# Patient Record
Sex: MALE | Race: WHITE | NOT HISPANIC OR LATINO | Employment: FULL TIME | URBAN - NONMETROPOLITAN AREA
[De-identification: names, ages, dates, MRNs, and addresses within clinical notes are randomized per-mention and may not be internally consistent; named-entity substitution may affect disease eponyms.]

---

## 2020-05-18 ENCOUNTER — OFFICE VISIT (OUTPATIENT)
Dept: FAMILY MEDICINE CLINIC | Facility: CLINIC | Age: 40
End: 2020-05-18

## 2020-05-18 VITALS
TEMPERATURE: 97.5 F | DIASTOLIC BLOOD PRESSURE: 86 MMHG | OXYGEN SATURATION: 97 % | BODY MASS INDEX: 32.32 KG/M2 | HEIGHT: 69 IN | SYSTOLIC BLOOD PRESSURE: 126 MMHG | HEART RATE: 76 BPM | WEIGHT: 218.2 LBS

## 2020-05-18 DIAGNOSIS — I10 ESSENTIAL HYPERTENSION: ICD-10-CM

## 2020-05-18 DIAGNOSIS — Z76.89 ENCOUNTER TO ESTABLISH CARE: Primary | ICD-10-CM

## 2020-05-18 DIAGNOSIS — H60.393 OTHER INFECTIVE ACUTE OTITIS EXTERNA OF BOTH EARS: ICD-10-CM

## 2020-05-18 DIAGNOSIS — Z00.00 GENERAL MEDICAL EXAM: ICD-10-CM

## 2020-05-18 PROCEDURE — 99202 OFFICE O/P NEW SF 15 MIN: CPT | Performed by: NURSE PRACTITIONER

## 2020-05-18 RX ORDER — OFLOXACIN 3 MG/ML
10 SOLUTION AURICULAR (OTIC) DAILY
Qty: 10 ML | Refills: 0 | Status: SHIPPED | OUTPATIENT
Start: 2020-05-18 | End: 2020-05-26

## 2020-05-18 RX ORDER — HYDROCHLOROTHIAZIDE 12.5 MG/1
12.5 CAPSULE, GELATIN COATED ORAL 2 TIMES DAILY
Qty: 180 CAPSULE | Refills: 3 | Status: SHIPPED | OUTPATIENT
Start: 2020-05-18 | End: 2021-02-16 | Stop reason: SDUPTHER

## 2020-05-18 RX ORDER — HYDROCHLOROTHIAZIDE 12.5 MG/1
CAPSULE, GELATIN COATED ORAL
COMMUNITY
Start: 2020-02-21 | End: 2020-05-18

## 2020-05-18 RX ORDER — LOSARTAN POTASSIUM 50 MG/1
50 TABLET ORAL 2 TIMES DAILY
Qty: 180 TABLET | Refills: 3 | Status: SHIPPED | OUTPATIENT
Start: 2020-05-18 | End: 2021-02-16 | Stop reason: SDUPTHER

## 2020-05-18 RX ORDER — LOSARTAN POTASSIUM 50 MG/1
50 TABLET ORAL 2 TIMES DAILY
COMMUNITY
Start: 2020-02-21 | End: 2020-05-18

## 2020-05-18 NOTE — PROGRESS NOTES
Subjective   Lloyd Hirsch is a 39 y.o. male. Establish care.    History of Present Illness Pt presents today to establish care. Had previous primary care provider in [place], [place]. His past medical history includes: essential hypertension. Denies any surgeries in the past. Family history includes: heart attack (grandparents), hypertension (mother), and oral CA (paternal grandfather). His is employed as  at [employer] in [place], [place]. Needs his blood pressure medications refilled. His only concern today is pruritis of ears. Denies any chest pain, heart palpations, lower extremity edema, or problems with bowel or bladder function.      The following portions of the patient's history were reviewed and updated as appropriate: allergies, current medications, past family history, past medical history, past social history, past surgical history and problem list.    Review of Systems   Constitutional: Negative for chills, fatigue and fever.   HENT: Positive for ear pain (pruritis of ears bilaterally ). Negative for congestion, rhinorrhea and sore throat.    Eyes: Negative for blurred vision, double vision and visual disturbance.   Respiratory: Negative for cough, chest tightness, shortness of breath and wheezing.    Cardiovascular: Negative for chest pain, palpitations and leg swelling.   Gastrointestinal: Negative for abdominal pain, diarrhea, nausea and vomiting.   Endocrine: Negative for cold intolerance and heat intolerance.   Genitourinary: Negative for difficulty urinating, dysuria, frequency and hematuria.   Musculoskeletal: Negative for arthralgias, back pain, neck pain and neck stiffness.   Skin: Negative for dry skin, pallor, rash, skin lesions and bruise.   Allergic/Immunologic: Negative for environmental allergies, food allergies and immunocompromised state.   Neurological: Negative for dizziness, syncope, weakness, light-headedness, headache and confusion.   Hematological: Negative for  adenopathy. Does not bruise/bleed easily.   Psychiatric/Behavioral: Negative for self-injury, sleep disturbance, suicidal ideas, depressed mood and stress. The patient is not nervous/anxious.        Objective   Physical Exam   Constitutional: He is oriented to person, place, and time. He appears well-developed and well-nourished. He is cooperative. He does not have a sickly appearance. He does not appear ill. No distress.   HENT:   Head: Normocephalic.   Right Ear: Hearing, tympanic membrane, external ear and ear canal normal. There is swelling. No drainage (reports drainage but none seen at this time).   Left Ear: Hearing, tympanic membrane, external ear and ear canal normal. There is swelling. No drainage (reports drainage but none seen as this time).   Nose: Nose normal.   Mouth/Throat: Uvula is midline, oropharynx is clear and moist and mucous membranes are normal.   Eyes: Pupils are equal, round, and reactive to light. Conjunctivae, EOM and lids are normal.   Neck: Trachea normal, normal range of motion, full passive range of motion without pain and phonation normal. Neck supple. Carotid bruit is not present. No thyroid mass and no thyromegaly present.   Cardiovascular: Normal rate, regular rhythm, S1 normal, S2 normal and normal heart sounds.   No murmur heard.  Pulmonary/Chest: Effort normal and breath sounds normal. No respiratory distress. He has no wheezes. He has no rhonchi. He has no rales.   Abdominal: Soft. Normal appearance. There is no tenderness.   Neurological: He is alert and oriented to person, place, and time. He has normal strength. No cranial nerve deficit. Coordination and gait normal.   Skin: Skin is warm, dry and intact. He is not diaphoretic. No pallor.   Psychiatric: He has a normal mood and affect. His speech is normal and behavior is normal. Judgment and thought content normal. Cognition and memory are normal.     There were no vitals filed for this visit.      Assessment/Plan   Lloyd  was seen today for establish care and hypertension.    Diagnoses and all orders for this visit:    Encounter to establish care    Essential hypertension  -     hydroCHLOROthiazide (MICROZIDE) 12.5 MG capsule; Take 1 capsule by mouth 2 (Two) Times a Day.  -     losartan (COZAAR) 50 MG tablet; Take 1 tablet by mouth 2 (Two) Times a Day.  -     Lipid Panel  -     TSH  -     CBC (No Diff)  -     Comprehensive metabolic panel    General medical exam  -     Lipid Panel  -     TSH  -     CBC (No Diff)  -     Comprehensive metabolic panel    Other infective acute otitis externa of both ears  -     ofloxacin (FLOXIN) 0.3 % otic solution; Administer 10 drops into both ears once daily for 7 days.    Encounter to establish care- He recently moved here and needed new PCP. I gathered his past medical and family history and competed physical exam, I look forward to caring for him.   Essential hypertension- Refilled BP medications. BP goal less than 130/90. Ordered labs (fasting) due to hypertension and general medical exam, will call with results.   Other infective acute otitis externa of both ears- ofloxacin 0.3% administer 10 drops in both ears once daily for 7 days. Pt educated on importance of not scratching ears and this leads to infections.  If symptoms do not improve or worsen, patient was instructed to return to clinic for further evaluation.         This document has been electronically signed by STEF Panchal on  May 18, 2020 09:50

## 2020-05-18 NOTE — PATIENT INSTRUCTIONS

## 2020-06-05 DIAGNOSIS — K21.9 GASTROESOPHAGEAL REFLUX DISEASE, ESOPHAGITIS PRESENCE NOT SPECIFIED: Primary | ICD-10-CM

## 2020-06-05 NOTE — TELEPHONE ENCOUNTER
PATIENT STATES HE HAS TRIED OTHER THINGS FOR HEART BURN.  HE SAID HE TAKES A VERY LOW DOSE NEXIUM.  HE CUTS OTC ONES IN HALF, BUT SAVES MONEY IF HE CAN GET RX FOR IT.  [facility]

## 2020-06-05 NOTE — TELEPHONE ENCOUNTER
Has he tried taking any heart burn medications before taking nexium? I hate to start him on nexium before trying something less potent. We usually try Pepcid first.

## 2020-06-05 NOTE — TELEPHONE ENCOUNTER
I sent in Nexium to [facility] pharmacy. If he would like I can also print off educational material that discusses other things that help with heartburn. If he would like that I will print and leave at desk for him to . Thanks.

## 2020-09-03 DIAGNOSIS — K21.9 GASTROESOPHAGEAL REFLUX DISEASE, ESOPHAGITIS PRESENCE NOT SPECIFIED: ICD-10-CM

## 2020-11-16 ENCOUNTER — OFFICE VISIT (OUTPATIENT)
Dept: FAMILY MEDICINE CLINIC | Facility: CLINIC | Age: 40
End: 2020-11-16

## 2020-11-16 VITALS
OXYGEN SATURATION: 97 % | HEART RATE: 80 BPM | SYSTOLIC BLOOD PRESSURE: 130 MMHG | HEIGHT: 60 IN | WEIGHT: 217.6 LBS | BODY MASS INDEX: 42.72 KG/M2 | DIASTOLIC BLOOD PRESSURE: 70 MMHG | TEMPERATURE: 97.5 F

## 2020-11-16 DIAGNOSIS — K21.9 GASTROESOPHAGEAL REFLUX DISEASE: ICD-10-CM

## 2020-11-16 DIAGNOSIS — T14.8XXA MUSCLE STRAIN: Primary | ICD-10-CM

## 2020-11-16 PROCEDURE — 99213 OFFICE O/P EST LOW 20 MIN: CPT | Performed by: NURSE PRACTITIONER

## 2020-11-16 RX ORDER — IBUPROFEN 600 MG/1
600 TABLET ORAL EVERY 6 HOURS PRN
Qty: 60 TABLET | Refills: 1 | Status: SHIPPED | OUTPATIENT
Start: 2020-11-16

## 2020-11-16 RX ORDER — METHYLPREDNISOLONE 4 MG/1
TABLET ORAL
Qty: 21 EACH | Refills: 0 | Status: SHIPPED | OUTPATIENT
Start: 2020-11-16

## 2020-11-16 NOTE — PROGRESS NOTES
Subjective   Lloyd Hirsch is a 40 y.o. male. Arm pain (left)    History of Present Illness   Patient presents today for left arm pain. He says the pain started around 3-4 weeks ago. Pain located in inner aspect of elbow. He describes pain as dull ache. Pain worsened with arm extension and improves with flexion. Denies any recent injury. Reports that he has been using his chain saw but feels he uses his right arm more than his left. At home he has tried using tennis elbow band which has not provided much relief.     The following portions of the patient's history were reviewed and updated as appropriate: allergies, current medications, past family history, past medical history, past social history, past surgical history, and problem list.    Review of Systems   Constitutional: Negative for chills, fatigue and fever.   HENT: Negative for congestion, ear pain, rhinorrhea and sore throat.    Eyes: Negative for blurred vision, double vision and visual disturbance.   Respiratory: Negative for cough, chest tightness, shortness of breath and wheezing.    Cardiovascular: Negative for chest pain, palpitations and leg swelling.   Gastrointestinal: Negative for abdominal pain, diarrhea, nausea and vomiting.   Endocrine: Negative for cold intolerance and heat intolerance.   Genitourinary: Negative for difficulty urinating, dysuria, frequency and hematuria.   Musculoskeletal: Positive for arthralgias (left arm pain). Negative for back pain, neck pain and neck stiffness.   Skin: Negative for dry skin, pallor, rash, skin lesions and bruise.   Allergic/Immunologic: Negative for environmental allergies, food allergies and immunocompromised state.   Neurological: Negative for dizziness, syncope, weakness, light-headedness, headache and confusion.   Hematological: Negative for adenopathy. Does not bruise/bleed easily.   Psychiatric/Behavioral: Negative for self-injury, sleep disturbance, suicidal ideas, depressed mood and stress. The  patient is not nervous/anxious.        Vitals:    11/16/20 0820   BP: 130/70   Pulse: 80   Temp: 97.5 °F (36.4 °C)   SpO2: 97%     Body mass index is 208.16 kg/m².    Objective   Physical Exam  Vitals signs and nursing note reviewed.   Constitutional:       Appearance: He is well-developed.   HENT:      Head: Normocephalic.   Eyes:      Conjunctiva/sclera: Conjunctivae normal.   Neck:      Musculoskeletal: Full passive range of motion without pain, normal range of motion and neck supple.   Musculoskeletal: Normal range of motion.      Left elbow: He exhibits normal range of motion, no swelling, no effusion, no deformity and no laceration.   Skin:     General: Skin is warm and dry.   Neurological:      Mental Status: He is alert and oriented to person, place, and time.      Coordination: Coordination normal.      Gait: Gait normal.   Psychiatric:         Attention and Perception: Attention normal.         Mood and Affect: Mood normal.         Speech: Speech normal.         Behavior: Behavior normal. Behavior is cooperative.         Thought Content: Thought content normal.         Cognition and Memory: Cognition normal.         Judgment: Judgment normal.       Vitals:    11/16/20 0820   BP: 130/70   Pulse: 80   Temp: 97.5 °F (36.4 °C)   SpO2: 97%           Assessment/Plan   Diagnoses and all orders for this visit:    1. Muscle strain (Primary)  -     ibuprofen (ADVIL,MOTRIN) 600 MG tablet; Take 1 tablet by mouth Every 6 (Six) Hours As Needed for Mild Pain  or Moderate Pain .  Dispense: 60 tablet; Refill: 1  -     methylPREDNISolone (MEDROL) 4 MG dose pack; Take as directed on package instructions.  Dispense: 21 each; Refill: 0    2. Gastroesophageal reflux disease  -     esomeprazole (nexIUM) 20 MG capsule; Take 1 capsule by mouth Every Morning Before Breakfast.  Dispense: 90 capsule; Refill: 3    Pt instructed to rest, heat, and stretch area of strain. It usually takes several weeks to heal a strain. He can take  ibuprofen 600 mg every 8 hours PRN for pain. I also want him to take a medrol raoul as directed to help reduce inflammation in the area.  Refilled Nexium for GERD symptoms.  If symptoms do not improve or worsen, patient was instructed to return to clinic for further evaluation.         This document has been electronically signed by STEF Panchal on  November 16, 2020 10:00 CST

## 2020-11-16 NOTE — PATIENT INSTRUCTIONS
Tendinitis    Tendinitis is inflammation of a tendon. A tendon is a strong cord of tissue that connects muscle to bone.  Tendinitis can affect any tendon, but it most commonly affects the:  · Shoulder tendon (rotator cuff).  · Ankle tendon (Achilles tendon).  · Elbow tendon (triceps tendon).  · Tendons in the wrist.  What are the causes?  This condition may be caused by:  · Overusing a tendon or muscle. This is common.  · Age-related wear and tear.  · Injury.  · Inflammatory conditions, such as arthritis.  · Certain medicines.  What increases the risk?  You are more likely to develop this condition if you do activities that involve the same movements over and over again (repetitive motions).  What are the signs or symptoms?  Symptoms of this condition may include:  · Pain.  · Tenderness.  · Mild swelling.  · Decreased range of motion.  How is this diagnosed?  This condition is diagnosed with a physical exam. You may also have tests, such as:  · Ultrasound. This uses sound waves to make an image of the inside of your body in the affected area.  · MRI.  How is this treated?  This condition may be treated by resting, icing, applying pressure (compression), and raising (elevating) the affected area above the level of your heart. This is known as RICE therapy. Treatment may also include:  · Medicines to help reduce inflammation or to help reduce pain.  · Exercises or physical therapy to strengthen and stretch the tendon.  · A brace or splint.  · Surgery. This is rarely needed.  Follow these instructions at home:  If you have a splint or brace:  · Wear the splint or brace as told by your health care provider. Remove it only as told by your health care provider.  · Loosen the splint or brace if your fingers or toes tingle, become numb, or turn cold and blue.  · Keep the splint or brace clean.  · If the splint or brace is not waterproof:  ? Do not let it get wet.  ? Cover it with a watertight covering when you take a bath  or shower.  Managing pain, stiffness, and swelling  · If directed, put ice on the affected area.  ? If you have a removable splint or brace, remove it as told by your health care provider.  ? Put ice in a plastic bag.  ? Place a towel between your skin and the bag.  ? Leave the ice on for 20 minutes, 2-3 times a day.  · Move the fingers or toes of the affected limb often, if this applies. This can help to prevent stiffness and lessen swelling.  · If directed, raise (elevate) the affected area above the level of your heart while you are sitting or lying down.  · If directed, apply heat to the affected area before you exercise. Use the heat source that your health care provider recommends, such as a moist heat pack or a heating pad.         ? Place a towel between your skin and the heat source.  ? Leave the heat on for 20-30 minutes.  ? Remove the heat if your skin turns bright red. This is especially important if you are unable to feel pain, heat, or cold. You may have a greater risk of getting burned.  Driving  · Do not drive or use heavy machinery while taking prescription pain medicine.  · Ask your health care provider when it is safe to drive if you have a splint or brace on any part of your arm or leg.  Activity  · Rest the affected area as told by your health care provider.  · Return to your normal activities as told by your health care provider. Ask your health care provider what activities are safe for you.  · Avoid using the affected area while you are experiencing symptoms of tendinitis.  · Do exercises as told by your health care provider.  General instructions  · If you have a splint, do not put pressure on any part of the splint until it is fully hardened. This may take several hours.  · Wear an elastic bandage or compression wrap only as told by your health care provider.  · Take over-the-counter and prescription medicines only as told by your health care provider.  · Keep all follow-up visits as told  by your health care provider. This is important.  Contact a health care provider if:  · Your symptoms do not improve.  · You develop new, unexplained problems, such as numbness in your hands.  Summary  · Tendinitis is inflammation of a tendon.  · You are more likely to develop this condition if you do activities that involve the same movements over and over again.  · This condition may be treated by resting, icing, applying pressure (compression), and elevating the area above the level of your heart. This is known as RICE therapy.  · Avoid using the affected area while you are experiencing symptoms of tendinitis.  This information is not intended to replace advice given to you by your health care provider. Make sure you discuss any questions you have with your health care provider.  Document Released: 12/15/2001 Document Revised: 06/25/2019 Document Reviewed: 05/08/2019  Elsevier Patient Education © 2020 [employer]

## 2020-12-17 DIAGNOSIS — T14.8XXA MUSCLE STRAIN: ICD-10-CM

## 2020-12-17 RX ORDER — IBUPROFEN 600 MG/1
600 TABLET ORAL EVERY 6 HOURS PRN
Qty: 60 TABLET | Refills: 1 | OUTPATIENT
Start: 2020-12-17

## 2020-12-17 NOTE — TELEPHONE ENCOUNTER
Medication for short term use only. He should not need it all the time. If still having pain he needs to be seen.

## 2021-02-16 DIAGNOSIS — I10 ESSENTIAL HYPERTENSION: ICD-10-CM

## 2021-02-17 RX ORDER — LOSARTAN POTASSIUM 50 MG/1
50 TABLET ORAL 2 TIMES DAILY
Qty: 180 TABLET | Refills: 3 | Status: SHIPPED | OUTPATIENT
Start: 2021-02-17 | End: 2021-05-11 | Stop reason: SDUPTHER

## 2021-02-17 RX ORDER — HYDROCHLOROTHIAZIDE 12.5 MG/1
12.5 CAPSULE, GELATIN COATED ORAL 2 TIMES DAILY
Qty: 180 CAPSULE | Refills: 3 | Status: SHIPPED | OUTPATIENT
Start: 2021-02-17 | End: 2021-05-11 | Stop reason: SDUPTHER

## 2021-05-11 ENCOUNTER — TELEPHONE (OUTPATIENT)
Dept: FAMILY MEDICINE CLINIC | Facility: CLINIC | Age: 41
End: 2021-05-11

## 2021-05-11 DIAGNOSIS — I10 ESSENTIAL HYPERTENSION: ICD-10-CM

## 2021-05-11 RX ORDER — LOSARTAN POTASSIUM 50 MG/1
50 TABLET ORAL 2 TIMES DAILY
Qty: 180 TABLET | Refills: 3 | Status: SHIPPED | OUTPATIENT
Start: 2021-05-11

## 2021-05-11 RX ORDER — HYDROCHLOROTHIAZIDE 12.5 MG/1
12.5 CAPSULE, GELATIN COATED ORAL 2 TIMES DAILY
Qty: 180 CAPSULE | Refills: 3 | Status: SHIPPED | OUTPATIENT
Start: 2021-05-11

## 2021-05-11 NOTE — TELEPHONE ENCOUNTER
THE PRESCRIPTIONS YOU SENT TO [facility]OYEE [facility] IN February DID NOT GO THROUGH.  WILL YOU RESEND  LOSARTAN AND HCTZ?

## 2021-09-23 ENCOUNTER — TELEPHONE (OUTPATIENT)
Dept: FAMILY MEDICINE CLINIC | Facility: CLINIC | Age: 41
End: 2021-09-23

## 2021-09-23 RX ORDER — TRIAMCINOLONE ACETONIDE 1 MG/G
1 CREAM TOPICAL 2 TIMES DAILY
Qty: 45 G | Refills: 0 | Status: SHIPPED | OUTPATIENT
Start: 2021-09-23

## 2021-09-23 NOTE — TELEPHONE ENCOUNTER
----- Message from Katarzyna Melgoza MA sent at 9/23/2021  8:36 AM CDT -----  Regarding: FW: Non-Urgent Medical Question  Contact: [phone]      ----- Message -----  From: Lloyd Hirsch  Sent: 9/23/2021   8:15 AM CDT  To: [facility]  Subject: Non-Urgent Medical Question                      Shawn Bailey,    I recently switched deodorant and have developed a rash in my arm pits. I have tried using over the counter Hydrocortisone 1% lotion and it has helped, but has not completely gotten rid of it. Would you be able to prescribe something for this or do I need to make an appointment?     Thank you,  Lloyd Hirsch